# Patient Record
Sex: FEMALE | URBAN - METROPOLITAN AREA
[De-identification: names, ages, dates, MRNs, and addresses within clinical notes are randomized per-mention and may not be internally consistent; named-entity substitution may affect disease eponyms.]

---

## 2020-12-28 ENCOUNTER — APPOINTMENT (RX ONLY)
Dept: URBAN - METROPOLITAN AREA CLINIC 77 | Facility: CLINIC | Age: 30
Setting detail: DERMATOLOGY
End: 2020-12-28

## 2020-12-28 ENCOUNTER — APPOINTMENT (OUTPATIENT)
Age: 30
Setting detail: DERMATOLOGY
End: 2020-12-28

## 2020-12-28 ENCOUNTER — APPOINTMENT (RX ONLY)
Dept: URBAN - METROPOLITAN AREA CLINIC 168 | Facility: CLINIC | Age: 30
Setting detail: DERMATOLOGY
End: 2020-12-28

## 2020-12-28 DIAGNOSIS — L21.8 OTHER SEBORRHEIC DERMATITIS: ICD-10-CM

## 2020-12-28 PROCEDURE — ? COUNSELING

## 2020-12-28 PROCEDURE — ? PRESCRIPTION

## 2020-12-28 PROCEDURE — 99202 OFFICE O/P NEW SF 15 MIN: CPT

## 2020-12-28 RX ORDER — FLUOCINOLONE ACETONIDE 0.11 MG/ML
THIN LAYER OIL TOPICAL
Qty: 1 | Refills: 1 | Status: ERX | COMMUNITY
Start: 2020-12-28

## 2020-12-28 RX ORDER — KETOCONAZOLE 20 MG/ML
LATHER SHAMPOO, SUSPENSION TOPICAL QD
Qty: 1 | Refills: 2 | Status: ERX | COMMUNITY
Start: 2020-12-28

## 2020-12-28 RX ADMIN — KETOCONAZOLE LATHER: 20 SHAMPOO, SUSPENSION TOPICAL at 00:00

## 2020-12-28 RX ADMIN — FLUOCINOLONE ACETONIDE THIN LAYER: 0.11 OIL TOPICAL at 00:00

## 2020-12-28 ASSESSMENT — LOCATION ZONE DERM
LOCATION ZONE: SCALP

## 2020-12-28 ASSESSMENT — LOCATION DETAILED DESCRIPTION DERM
LOCATION DETAILED: MID-FRONTAL SCALP

## 2020-12-28 ASSESSMENT — LOCATION SIMPLE DESCRIPTION DERM
LOCATION SIMPLE: ANTERIOR SCALP

## 2020-12-28 NOTE — HPI: ITCHING (SCALP)
Additional History: Patient has tried multiple OTC products including head & shoulders x 6 months and many others, allowing all topicals to set in scalp for 5-10 minutes with no success. She also started OTC daily vitamins and increased water intake.